# Patient Record
Sex: FEMALE | Race: WHITE | URBAN - METROPOLITAN AREA
[De-identification: names, ages, dates, MRNs, and addresses within clinical notes are randomized per-mention and may not be internally consistent; named-entity substitution may affect disease eponyms.]

---

## 2018-08-08 ENCOUNTER — OFFICE VISIT (OUTPATIENT)
Dept: URBAN - METROPOLITAN AREA CLINIC 193 | Facility: CLINIC | Age: 63
End: 2018-08-08
Payer: MEDICAID

## 2018-08-08 DIAGNOSIS — H26.491 OTHER SECONDARY CATARACT, RIGHT EYE: Primary | ICD-10-CM

## 2018-08-08 DIAGNOSIS — H43.813 VITREOUS DEGENERATION, BILATERAL: ICD-10-CM

## 2018-08-08 DIAGNOSIS — H40.023 OPEN ANGLE WITH BORDERLINE FINDINGS, HIGH RISK, BILATERAL: ICD-10-CM

## 2018-08-08 PROCEDURE — 99204 OFFICE O/P NEW MOD 45 MIN: CPT | Performed by: OPTOMETRIST

## 2018-08-08 PROCEDURE — 92133 CPTRZD OPH DX IMG PST SGM ON: CPT | Performed by: OPTOMETRIST

## 2018-08-08 ASSESSMENT — INTRAOCULAR PRESSURE
OD: 13
OS: 12

## 2018-08-08 NOTE — IMPRESSION/PLAN
Impression: Open angle with borderline findings, high risk, bilateral: H40.023. Lg CDs OU. OD>OS. OCT RNFL 8/8/18: thinning sup OD, WNL OS. [facility] eye doc tested for glc suspicion in 2015 (OCT/VF) Plan: GLC: Discussed condition in detail. Further testing needed. Schedule VF next available without review then in 4 months, repeat OCT of optic nerve, Visual Field 24-2, and pachymetry. Request records (OCT/VF) from [facility] eye doc.

## 2018-08-08 NOTE — IMPRESSION/PLAN
Impression: Other secondary cataract, right eye: H26.491. doesn't seem visually significant but no other known cause for decreased vision OD. Mac OCT appeared normal OU. Plan: Discussed. Advise no YAG cap OD at this time. Pt would also prefer to wait for now. Continue to monitor. Advise glasses for best VA OU.

## 2018-08-20 ENCOUNTER — TESTING ONLY (OUTPATIENT)
Dept: URBAN - METROPOLITAN AREA CLINIC 193 | Facility: CLINIC | Age: 63
End: 2018-08-20
Payer: MEDICAID

## 2018-08-20 PROCEDURE — 92083 EXTENDED VISUAL FIELD XM: CPT | Performed by: OPTOMETRIST

## 2018-08-27 NOTE — IMPRESSION/PLAN
Impression: Open angle with borderline findings, high risk, bilateral: H40.023. Lg CDs OU. OD>OS. OCT RNFL 8/8/18: thinning sup OD, WNL OS. VF 8/20/18: WNL OU. [facility] eye doc tested for glc suspicion in 2015 (OCT/VF) Plan: GLC: Discussed condition in detail. Repeat OCT of optic nerve, Visual Field 24-2, and pachymetry. Request records (OCT/VF) from [facility] eye doc.